# Patient Record
Sex: MALE | Race: WHITE | NOT HISPANIC OR LATINO | Employment: OTHER | URBAN - METROPOLITAN AREA
[De-identification: names, ages, dates, MRNs, and addresses within clinical notes are randomized per-mention and may not be internally consistent; named-entity substitution may affect disease eponyms.]

---

## 2017-03-14 ENCOUNTER — HOSPITAL ENCOUNTER (INPATIENT)
Facility: MEDICAL CENTER | Age: 82
LOS: 2 days | DRG: 369 | End: 2017-03-16
Attending: EMERGENCY MEDICINE | Admitting: HOSPITALIST
Payer: MEDICARE

## 2017-03-14 ENCOUNTER — RESOLUTE PROFESSIONAL BILLING HOSPITAL PROF FEE (OUTPATIENT)
Dept: HOSPITALIST | Facility: MEDICAL CENTER | Age: 82
End: 2017-03-14
Payer: MEDICARE

## 2017-03-14 DIAGNOSIS — K92.2 GASTROINTESTINAL HEMORRHAGE, UNSPECIFIED GASTROINTESTINAL HEMORRHAGE TYPE: ICD-10-CM

## 2017-03-14 LAB
ABO GROUP BLD: NORMAL
ABO GROUP BLD: NORMAL
ALBUMIN SERPL BCP-MCNC: 3.6 G/DL (ref 3.2–4.9)
ALBUMIN/GLOB SERPL: 1.4 G/DL
ALP SERPL-CCNC: 157 U/L (ref 30–99)
ALT SERPL-CCNC: 22 U/L (ref 2–50)
ANION GAP SERPL CALC-SCNC: 7 MMOL/L (ref 0–11.9)
APTT PPP: 33.5 SEC (ref 24.7–36)
AST SERPL-CCNC: 28 U/L (ref 12–45)
BASOPHILS # BLD AUTO: 0.4 % (ref 0–1.8)
BASOPHILS # BLD: 0.01 K/UL (ref 0–0.12)
BILIRUB SERPL-MCNC: 1.6 MG/DL (ref 0.1–1.5)
BLD GP AB SCN SERPL QL: NORMAL
BUN SERPL-MCNC: 22 MG/DL (ref 8–22)
CALCIUM SERPL-MCNC: 9 MG/DL (ref 8.5–10.5)
CHLORIDE SERPL-SCNC: 104 MMOL/L (ref 96–112)
CO2 SERPL-SCNC: 27 MMOL/L (ref 20–33)
CREAT SERPL-MCNC: 0.77 MG/DL (ref 0.5–1.4)
EOSINOPHIL # BLD AUTO: 0.1 K/UL (ref 0–0.51)
EOSINOPHIL NFR BLD: 3.8 % (ref 0–6.9)
ERYTHROCYTE [DISTWIDTH] IN BLOOD BY AUTOMATED COUNT: 44.5 FL (ref 35.9–50)
GFR SERPL CREATININE-BSD FRML MDRD: >60 ML/MIN/1.73 M 2
GLOBULIN SER CALC-MCNC: 2.5 G/DL (ref 1.9–3.5)
GLUCOSE SERPL-MCNC: 94 MG/DL (ref 65–99)
HCT VFR BLD AUTO: 36.9 % (ref 42–52)
HGB BLD-MCNC: 11.8 G/DL (ref 14–18)
IMM GRANULOCYTES # BLD AUTO: 0 K/UL (ref 0–0.11)
IMM GRANULOCYTES NFR BLD AUTO: 0 % (ref 0–0.9)
INR PPP: 1.13 (ref 0.87–1.13)
LYMPHOCYTES # BLD AUTO: 0.51 K/UL (ref 1–4.8)
LYMPHOCYTES NFR BLD: 19.2 % (ref 22–41)
MCH RBC QN AUTO: 25.8 PG (ref 27–33)
MCHC RBC AUTO-ENTMCNC: 32 G/DL (ref 33.7–35.3)
MCV RBC AUTO: 80.7 FL (ref 81.4–97.8)
MONOCYTES # BLD AUTO: 0.19 K/UL (ref 0–0.85)
MONOCYTES NFR BLD AUTO: 7.1 % (ref 0–13.4)
NEUTROPHILS # BLD AUTO: 1.85 K/UL (ref 1.82–7.42)
NEUTROPHILS NFR BLD: 69.5 % (ref 44–72)
NRBC # BLD AUTO: 0 K/UL
NRBC BLD AUTO-RTO: 0 /100 WBC
PLATELET # BLD AUTO: 72 K/UL (ref 164–446)
PMV BLD AUTO: 11.1 FL (ref 9–12.9)
POTASSIUM SERPL-SCNC: 3.8 MMOL/L (ref 3.6–5.5)
PROT SERPL-MCNC: 6.1 G/DL (ref 6–8.2)
PROTHROMBIN TIME: 14.9 SEC (ref 12–14.6)
RBC # BLD AUTO: 4.57 M/UL (ref 4.7–6.1)
RH BLD: NORMAL
SODIUM SERPL-SCNC: 138 MMOL/L (ref 135–145)
TROPONIN I SERPL-MCNC: <0.01 NG/ML (ref 0–0.04)
WBC # BLD AUTO: 2.7 K/UL (ref 4.8–10.8)

## 2017-03-14 PROCEDURE — 36415 COLL VENOUS BLD VENIPUNCTURE: CPT

## 2017-03-14 PROCEDURE — 99223 1ST HOSP IP/OBS HIGH 75: CPT | Mod: AI | Performed by: HOSPITALIST

## 2017-03-14 PROCEDURE — 700105 HCHG RX REV CODE 258: Performed by: EMERGENCY MEDICINE

## 2017-03-14 PROCEDURE — 84484 ASSAY OF TROPONIN QUANT: CPT

## 2017-03-14 PROCEDURE — 304561 HCHG STAT O2

## 2017-03-14 PROCEDURE — 85025 COMPLETE CBC W/AUTO DIFF WBC: CPT

## 2017-03-14 PROCEDURE — 86850 RBC ANTIBODY SCREEN: CPT

## 2017-03-14 PROCEDURE — 96365 THER/PROPH/DIAG IV INF INIT: CPT

## 2017-03-14 PROCEDURE — 700111 HCHG RX REV CODE 636 W/ 250 OVERRIDE (IP): Performed by: EMERGENCY MEDICINE

## 2017-03-14 PROCEDURE — 770006 HCHG ROOM/CARE - MED/SURG/GYN SEMI*

## 2017-03-14 PROCEDURE — 96366 THER/PROPH/DIAG IV INF ADDON: CPT

## 2017-03-14 PROCEDURE — 80053 COMPREHEN METABOLIC PANEL: CPT

## 2017-03-14 PROCEDURE — 86901 BLOOD TYPING SEROLOGIC RH(D): CPT

## 2017-03-14 PROCEDURE — 85730 THROMBOPLASTIN TIME PARTIAL: CPT

## 2017-03-14 PROCEDURE — 94760 N-INVAS EAR/PLS OXIMETRY 1: CPT

## 2017-03-14 PROCEDURE — 86900 BLOOD TYPING SEROLOGIC ABO: CPT

## 2017-03-14 PROCEDURE — 85610 PROTHROMBIN TIME: CPT

## 2017-03-14 PROCEDURE — 99285 EMERGENCY DEPT VISIT HI MDM: CPT

## 2017-03-14 PROCEDURE — 304562 HCHG STAT O2 MASK/CANNULA

## 2017-03-14 PROCEDURE — 96375 TX/PRO/DX INJ NEW DRUG ADDON: CPT

## 2017-03-14 RX ORDER — FEXOFENADINE HCL 60 MG/1
60 TABLET, FILM COATED ORAL DAILY
COMMUNITY

## 2017-03-14 RX ORDER — ATORVASTATIN CALCIUM 10 MG/1
10 TABLET, FILM COATED ORAL DAILY
COMMUNITY

## 2017-03-14 RX ORDER — HYDRALAZINE HYDROCHLORIDE 20 MG/ML
10 INJECTION INTRAMUSCULAR; INTRAVENOUS EVERY 6 HOURS PRN
Status: DISCONTINUED | OUTPATIENT
Start: 2017-03-14 | End: 2017-03-16 | Stop reason: HOSPADM

## 2017-03-14 RX ORDER — FLUTICASONE PROPIONATE 50 MCG
1 SPRAY, SUSPENSION (ML) NASAL DAILY
COMMUNITY

## 2017-03-14 RX ORDER — MELOXICAM 7.5 MG/1
7.5 TABLET ORAL DAILY
Status: ON HOLD | COMMUNITY
End: 2017-03-16

## 2017-03-14 RX ORDER — ONDANSETRON 2 MG/ML
4 INJECTION INTRAMUSCULAR; INTRAVENOUS EVERY 4 HOURS PRN
Status: DISCONTINUED | OUTPATIENT
Start: 2017-03-14 | End: 2017-03-16 | Stop reason: HOSPADM

## 2017-03-14 RX ORDER — SODIUM CHLORIDE 9 MG/ML
500 INJECTION, SOLUTION INTRAVENOUS ONCE
Status: COMPLETED | OUTPATIENT
Start: 2017-03-14 | End: 2017-03-14

## 2017-03-14 RX ORDER — OCTREOTIDE ACETATE 50 UG/ML
50 INJECTION, SOLUTION INTRAVENOUS; SUBCUTANEOUS ONCE
Status: COMPLETED | OUTPATIENT
Start: 2017-03-14 | End: 2017-03-14

## 2017-03-14 RX ORDER — PANTOPRAZOLE SODIUM 40 MG/10ML
40 INJECTION, POWDER, LYOPHILIZED, FOR SOLUTION INTRAVENOUS 2 TIMES DAILY
Status: DISCONTINUED | OUTPATIENT
Start: 2017-03-14 | End: 2017-03-16 | Stop reason: HOSPADM

## 2017-03-14 RX ORDER — ONDANSETRON 4 MG/1
4 TABLET, ORALLY DISINTEGRATING ORAL EVERY 4 HOURS PRN
Status: DISCONTINUED | OUTPATIENT
Start: 2017-03-14 | End: 2017-03-16 | Stop reason: HOSPADM

## 2017-03-14 RX ADMIN — SODIUM CHLORIDE 500 ML: 9 INJECTION, SOLUTION INTRAVENOUS at 16:33

## 2017-03-14 RX ADMIN — OCTREOTIDE ACETATE 50 MCG: 100 INJECTION, SOLUTION INTRAVENOUS; SUBCUTANEOUS at 17:45

## 2017-03-14 RX ADMIN — OCTREOTIDE ACETATE 50 MCG/HR: 200 INJECTION, SOLUTION INTRAVENOUS; SUBCUTANEOUS at 17:58

## 2017-03-14 ASSESSMENT — LIFESTYLE VARIABLES
EVER_SMOKED: YES
ALCOHOL_USE: NO
DO YOU DRINK ALCOHOL: NO

## 2017-03-14 ASSESSMENT — PAIN SCALES - GENERAL
PAINLEVEL_OUTOF10: 0
PAINLEVEL_OUTOF10: 0

## 2017-03-14 NOTE — ED NOTES
Jesus Manuel avilez, report transferred [facility], had an EGD for esophageal varices, non bleeding,  and noted pool of blood in stomach. Upon arrival pt c/o feeling sleepy; was given 50 mcg fentanyl, 2 mg versed, 2 sprays topex intraprocedure. Pt states unable to vomit d/t hiatal hernia repair.

## 2017-03-14 NOTE — IP AVS SNAPSHOT
[employer] Access Code: [email or web address]  Expires: 4/15/2017  3:36 PM    Your email address is not on file at [facility].  Email Addresses are required for you to sign up for [employer], please contact [phone] to verify your personal information and to provide your email address prior to attempting to register for [employer].    Georges Levy Yogi  [address] [place] [address]    [employer]  A secure, online tool to manage your health information     [facility]’s [employer]® is a secure, online tool that connects you to your personalized health information from the privacy of your home -- day or night - making it very easy for you to manage your healthcare. Once the activation process is completed, you can even access your medical information using the [employer] mckinley, which is available for free in the Apple Mckinley store or Google Play store.     To learn more about [employer], visit [email or web address]    There are two levels of access available (as shown below):   My Chart Features  [facility] Primary Care Doctor [facility]  Specialists [facility]  Urgent  Care Non-[facility] Primary Care Doctor   Email your healthcare team securely and privately 24/7 X X X    Manage appointments: schedule your next appointment; view details of past/upcoming appointments X      Request prescription refills. X      View recent personal medical records, including lab and immunizations X X X X   View health record, including health history, allergies, medications X X X X   Read reports about your outpatient visits, procedures, consult and ER notes X X X X   See your discharge summary, which is a recap of your hospital and/or ER visit that includes your diagnosis, lab results, and care plan X X  X     How to register for [employer]:  Once your e-mail address has been verified, follow the following steps to sign up for [employer].     1. Go to  [email or web address]  2. Click on the Sign Up Now box, which takes you to the New Member Sign Up page. You will  need to provide the following information:  a. Enter your [employer] Access Code exactly as it appears at the top of this page. (You will not need to use this code after you’ve completed the sign-up process. If you do not sign up before the expiration date, you must request a new code.)   b. Enter your date of birth.   c. Enter your home email address.   d. Click Submit, and follow the next screen’s instructions.  3. Create a [employer] ID. This will be your [employer] login ID and cannot be changed, so think of one that is secure and easy to remember.  4. Create a [employer] password. You can change your password at any time.  5. Enter your Password Reset Question and Answer. This can be used at a later time if you forget your password.   6. Enter your e-mail address. This allows you to receive e-mail notifications when new information is available in [employer].  7. Click Sign Up. You can now view your health information.    For assistance activating your [employer] account, call [phone]

## 2017-03-14 NOTE — IP AVS SNAPSHOT
" Home Care Instructions                                                                                                                  Name:Georges Calix  Medical Record Number:[id number]  CSN: [id number]    YOB: 1934   Age: 83 y.o.  Sex: male  HT:1.702 m (5' 7.01\") WT: 69 kg (152 lb 1.9 oz)          Admit Date: 3/14/2017     Discharge Date:   Today's Date: 3/16/2017  Attending Doctor:  Tobin Gutiérrez M.D.                  Allergies:  Sulfa drugs            Discharge Instructions       Discharge Instructions    Discharged to home by car with relative. Discharged via wheelchair, hospital escort: Yes.  Special equipment needed: Not Applicable    Be sure to schedule a follow-up appointment with your primary care doctor or any specialists as instructed.     Discharge Plan:   Diet Plan: Discussed  Activity Level: Discussed  Confirmed Follow up Appointment: Patient to Call and Schedule Appointment  Confirmed Symptoms Management: Discussed  Medication Reconciliation Updated: Yes  Influenza Vaccine Indication: Not indicated: Previously immunized this influenza season and > 8 years of age    I understand that a diet low in cholesterol, fat, and sodium is recommended for good health. Unless I have been given specific instructions below for another diet, I accept this instruction as my diet prescription.   Other diet: Low sodium, low fat, well balanced diet. Advance as tolerated.    Special Instructions: None    · Is patient discharged on Warfarin / Coumadin?   No     · Is patient Post Blood Transfusion?  No    Depression / Suicide Risk    As you are discharged from this [facility] facility, it is important to learn how to keep safe from harming yourself.    Recognize the warning signs:  · Abrupt changes in personality, positive or negative- including increase in energy   · Giving away possessions  · Change in eating patterns- significant weight changes-  positive or negative  · Change in sleeping patterns- " unable to sleep or sleeping all the time   · Unwillingness or inability to communicate  · Depression  · Unusual sadness, discouragement and loneliness  · Talk of wanting to die  · Neglect of personal appearance   · Rebelliousness- reckless behavior  · Withdrawal from people/activities they love  · Confusion- inability to concentrate     If you or a loved one observes any of these behaviors or has concerns about self-harm, here's what you can do:  · Talk about it- your feelings and reasons for harming yourself  · Remove any means that you might use to hurt yourself (examples: pills, rope, extension cords, firearm)  · Get professional help from the community (Mental Health, Substance Abuse, psychological counseling)  · Do not be alone:Call your Safe Contact- someone whom you trust who will be there for you.  · Call your local CRISIS HOTLINE 675-4825 or 264-535-4918  · Call your local Children's Mobile Crisis Response Team [place] (015) 935-8572 or www.Identify  · Call the toll free National Suicide Prevention Hotlines   · National Suicide Prevention Lifeline 918-369-NKWZ (4999)  · National Hope Line Network 800-SUICIDE (433-6613)    Gastrointestinal Bleeding  Gastrointestinal bleeding is bleeding somewhere along the path that food travels through the body (digestive tract). This path is anywhere between the mouth and the opening of the butt (anus). You may have blood in your throw up (vomit) or in your poop (stools). If there is a lot of bleeding, you may need to stay in the hospital.  HOME CARE  · Only take medicine as told by your doctor.  · Eat foods with fiber such as whole grains, fruits, and vegetables. You can also try eating 1 to 3 prunes a day.  · Drink enough fluids to keep your pee (urine) clear or pale yellow.  GET HELP RIGHT AWAY IF:   · Your bleeding gets worse.  · You feel dizzy, weak, or you pass out (faint).  · You have bad cramps in your back or belly (abdomen).  · You have large blood  clumps (clots) in your poop.  · Your problems are getting worse.  MAKE SURE YOU:   · Understand these instructions.  · Will watch your condition.  · Will get help right away if you are not doing well or get worse.     This information is not intended to replace advice given to you by your health care provider. Make sure you discuss any questions you have with your health care provider.     Document Released: 09/26/2009 Document Revised: 12/04/2013 Document Reviewed: 11/26/2012  [employer] Interactive Patient Education ©2016 [employer] Inc.      Follow-up Information     1. Follow up with Shelley Martinez M.D.. Go on 3/30/2017.    Specialty:  Gastroenterology    Why:  PLEASE ARRIVE AT 1:15PM FOR YOUR APPOINTMENT. THANK YOU    Contact information    [address]    [place] [address]  [phone]           Discharge Medication Instructions:    Below are the medications your physician expects you to take upon discharge:    Review all your home medications and newly ordered medications with your doctor and/or pharmacist. Follow medication instructions as directed by your doctor and/or pharmacist.    Please keep your medication list with you and share with your physician.               Medication List      START taking these medications        Instructions    omeprazole 20 MG delayed-release capsule   Commonly known as:  PRILOSEC   Next Dose Due:  3/17/2017   Notes to Patient:  In the morning. This is a proton pump inhibitor, it helps to protect your GI tract.    Take 1 Cap by mouth every day.   Dose:  20 mg         CONTINUE taking these medications        Instructions    aspirin EC 81 MG Tbec   Commonly known as:  ECOTRIN   Next Dose Due:  3/17/2017    Take 81 mg by mouth every day.   Dose:  81 mg       atorvastatin 10 MG Tabs   Last time this was given:  10 mg on 3/15/2017  8:51 PM   Commonly known as:  LIPITOR   Next Dose Due:  3/16/2017    Take 10 mg by mouth every day.   Dose:  10 mg       fexofenadine 60 MG Tabs      Commonly known as:  ALLEGRA   Next Dose Due:  3/17/2017    Take 60 mg by mouth every day.   Dose:  60 mg       fluticasone 50 MCG/ACT nasal spray   Last time this was given:  50 mcg on 3/16/2017  8:25 AM   Commonly known as:  FLONASE   Next Dose Due:  3/17/2017    Spray 1 Spray in nose every day.   Dose:  1 Spray         STOP taking these medications     meloxicam 7.5 MG Tabs   Commonly known as:  MOBIC               Instructions           Diet / Nutrition:    Follow any diet instructions given to you by your doctor or the dietician, including how much salt (sodium) you are allowed each day.    If you are overweight, talk to your doctor about a weight reduction plan.    Activity:    Remain physically active following your doctor's instructions about exercise and activity.    Rest often.     Any time you become even a little tired or short of breath, SIT DOWN and rest.    Worsening Symptoms:    Report any of the following signs and symptoms to the doctor's office immediately:    *Pain of jaw, arm, or neck  *Chest pain not relieved by medication                               *Dizziness or loss of consciousness  *Difficulty breathing even when at rest   *More tired than usual                                       *Bleeding drainage or swelling of surgical site  *Swelling of feet, ankles, legs or stomach                 *Fever (>100ºF)  *Pink or blood tinged sputum  *Weight gain (3lbs/day or 5lbs /week)           *Shock from internal defibrillator (if applicable)  *Palpitations or irregular heartbeats                *Cool and/or numb extremities    Stroke Awareness    Common Risk Factors for Stroke include:    Age  Atrial Fibrillation  Carotid Artery Stenosis  Diabetes Mellitus  Excessive alcohol consumption  High blood pressure  Overweight   Physical inactivity  Smoking    Warning signs and symptoms of a stroke include:    *Sudden numbness or weakness of the face, arm or leg (especially on one side of the  body).  *Sudden confusion, trouble speaking or understanding.  *Sudden trouble seeing in one or both eyes.  *Sudden trouble walking, dizziness, loss of balance or coordination.Sudden severe headache with no known cause.    It is very important to get treatment quickly when a stroke occurs. If you experience any of the above warning signs, call 911 immediately.                   Disclaimer         Quit Smoking / Tobacco Use:    I understand the use of any tobacco products increases my chance of suffering from future heart disease or stroke and could cause other illnesses which may shorten my life. Quitting the use of tobacco products is the single most important thing I can do to improve my health. For further information on smoking / tobacco cessation call a Toll Free Quit Line at 1-826.360.5994 (*National Cancer [place]) or 1-234.989.7842 (American Lung Association) or you can access the web based program at www.lungusa.org.    [place] Tobacco Users Help Line:  (854) 209-6147       Toll Free: 1-893.922.1851  Quit Tobacco Program [facility] (044)295-3525    Crisis Hotline:    [place] Crisis Hotline:  5-013-EALZALH or 1-887.616.4803    [place] Crisis Hotline:    1-533.855.7362 or 593-158-3153    Discharge Survey:   Thank you for choosing [facility]. We hope we did everything we could to make your hospital stay a pleasant one. You may be receiving a phone survey and we would appreciate your time and participation in answering the questions. Your input is very valuable to us in our efforts to improve our service to our patients and their families.        My signature on this form indicates that:    1. I have reviewed and understand the above information.  2. My questions regarding this information have been answered to my satisfaction.  3. I have formulated a plan with my discharge nurse to obtain my prescribed medications for home.                  Disclaimer          __________________________________                     __________       ________                       Patient Signature                                                 Date                    Time

## 2017-03-14 NOTE — IP AVS SNAPSHOT
3/16/2017          Georges Calix  [address] [facility] [place] [address]    Dear Georges:    [facility] wants to ensure your discharge home is safe and you or your loved ones have had all your questions answered regarding your care after you leave the hospital.    You may receive a telephone call within two days of your discharge.  This call is to make certain you understand your discharge instructions as well as ensure we provided you with the best care possible during your stay with us.     The call will only last approximately 3-5 minutes and will be done by a nurse.    Once again, we want to ensure your discharge home is safe and that you have a clear understanding of any next steps in your care.  If you have any questions or concerns, please do not hesitate to contact us, we are here for you.  Thank you for choosing [facility] for your healthcare needs.    Sincerely,    Onesimo Diana    [facility]

## 2017-03-14 NOTE — IP AVS SNAPSHOT
" <p align=\"LEFT\"><IMG SRC=\"//EMRWB/blob$/[email or web address]\" alt=\"Image\" WIDTH=\"50%\" HEIGHT=\"200\" BORDER=\"\"></p>                   Name:Georges Calix  Medical Record Number:[id number]  CSN: [id number]    YOB: 1934   Age: 83 y.o.  Sex: male  HT:1.702 m (5' 7.01\") WT: 69 kg (152 lb 1.9 oz)          Admit Date: 3/14/2017     Discharge Date:   Today's Date: 3/16/2017  Attending Doctor:  Tobin Gutiérrez M.D.                  Allergies:  Sulfa drugs          Follow-up Information     1. Follow up with Shelley Martinez M.D.. Go on 3/30/2017.    Specialty:  Gastroenterology    Why:  PLEASE ARRIVE AT 1:15PM FOR YOUR APPOINTMENT. THANK YOU    Contact information    [address] [place] [id number]  [phone]           Medication List      Take these Medications        Instructions    aspirin EC 81 MG Tbec   Commonly known as:  ECOTRIN    Take 81 mg by mouth every day.   Dose:  81 mg       atorvastatin 10 MG Tabs   Commonly known as:  LIPITOR    Take 10 mg by mouth every day.   Dose:  10 mg       fexofenadine 60 MG Tabs   Commonly known as:  ALLEGRA    Take 60 mg by mouth every day.   Dose:  60 mg       fluticasone 50 MCG/ACT nasal spray   Commonly known as:  FLONASE    Spray 1 Spray in nose every day.   Dose:  1 Spray       omeprazole 20 MG delayed-release capsule   Commonly known as:  PRILOSEC   Notes to Patient:  In the morning. This is a proton pump inhibitor, it helps to protect your GI tract.    Take 1 Cap by mouth every day.   Dose:  20 mg         "

## 2017-03-14 NOTE — ED PROVIDER NOTES
"ED Provider Note    Scribed for Sav Salas M.D. by Henrietta Vazquez. 3/14/2017  4:26 PM    Primary care provider: Pcp Not In Computer  Means of arrival: Ambulance  History obtained from: Patient  History limited by: None    CHIEF COMPLAINT  Chief Complaint   Patient presents with   • Upper GI Bleed       HPI  Georges Calix is a 83 y.o. male who presents to the Emergency Department by ambulance for evaluation of an upper GI bleed.  Patient was [facility] after they noticed a large pool of blood during and endoscopy for further evaluation. Patient has large distal varices Patient has no associated intermittent chest pain. He denies any vomiting secondary to his hiatal hernia repair in the past.  Patient sees Dr. Mejia for his esophageal and GI issues. Denies abdominal pains, nausea vomiting, diarrhea, black or bloody bowel movements.    REVIEW OF SYSTEMS  Pertinent positives include GI bleed, chest pain. Pertinent negatives include no vomiting.  All other systems reviewed and negative.    PAST MEDICAL HISTORY  Hiatal hernia repair.     SURGICAL HISTORY  patient denies any surgical history    SOCIAL HISTORY   Patient lives in [place], [place] with his wife.     FAMILY HISTORY  No pertinent family history    CURRENT MEDICATIONS  No current facility-administered medications on file prior to encounter.     No current outpatient prescriptions on file prior to encounter.       ALLERGIES  Allergies   Allergen Reactions   • Sulfa Drugs        PHYSICAL EXAM  VITAL SIGNS: /92 mmHg  Pulse 61  Temp(Src) 37.1 °C (98.7 °F)  Resp 20  Ht 1.702 m (5' 7.01\")  Wt 71.668 kg (158 lb)  BMI 24.74 kg/m2  SpO2 100%    Constitutional: Slightly pale, Well developed, Well nourished, No distress, Non-toxic appearance.   HENT: Normocephalic, Atraumatic, Bilateral external ears normal, Oropharynx moist, No oral exudates.   Eyes: PERRLA, EOMI, Conjunctiva normal, No discharge.   Neck: No " tenderness, Supple, No stridor.   Lymphatic: No lymphadenopathy noted.   Cardiovascular: Normal heart rate, Normal rhythm.   Thorax & Lungs: Clear to auscultation bilaterally, No respiratory distress, No wheezing, No crackles.   Abdomen: Soft, No tenderness, No masses, No pulsatile masses.   Skin: Warm, Dry, No erythema, No rash.   Extremities:, No edema No cyanosis.   Musculoskeletal: No tenderness to palpation or major deformities noted.  Intact distal pulses  Neurologic: Awake, alert. Moves all extremities spontaneously.  Psychiatric: Affect normal, Judgment normal, Mood normal.     LABS  Labs Reviewed   CBC WITH DIFFERENTIAL - Abnormal; Notable for the following:     WBC 2.7 (*)     RBC 4.57 (*)     Hemoglobin 11.8 (*)     Hematocrit 36.9 (*)     MCV 80.7 (*)     MCH 25.8 (*)     MCHC 32.0 (*)     Platelet Count 72 (*)     Lymphocytes 19.20 (*)     Lymphs (Absolute) 0.51 (*)     All other components within normal limits    Narrative:     Indicate which anticoagulants the patient is on:->UNKNOWN   COMP METABOLIC PANEL - Abnormal; Notable for the following:     Alkaline Phosphatase 157 (*)     Total Bilirubin 1.6 (*)     All other components within normal limits    Narrative:     Indicate which anticoagulants the patient is on:->UNKNOWN   PROTHROMBIN TIME - Abnormal; Notable for the following:     PT 14.9 (*)     All other components within normal limits    Narrative:     Indicate which anticoagulants the patient is on:->UNKNOWN   APTT    Narrative:     Indicate which anticoagulants the patient is on:->UNKNOWN   COD (ADULT)   TROPONIN    Narrative:     Indicate which anticoagulants the patient is on:->UNKNOWN   ABO CONFIRMATION   ESTIMATED GFR    Narrative:     Indicate which anticoagulants the patient is on:->UNKNOWN     All labs reviewed by me.    RADIOLOGY  No orders to display     The radiologist's interpretation of all radiological studies have been reviewed by me.    COURSE & MEDICAL DECISION  MAKING  Pertinent Labs & Imaging studies reviewed. (See chart for details)    I reviewed the patient's medical records which showed patient was receiving an endoscopy by Dr. Mejia who noted a large pool of blood in stomach so sent patient to ED for further evaluation. He did not see any active bleeding.     4:26 PM - Patient seen and examined at bedside. Patient will be treated with 500mL NS infusion. Ordered CBC, CMP, APTT, prothrombin time, COD, troponin, estimated GFR and other labs to evaluate his symptoms.  Decision Making:  Patient with gastric bleeding from esophageal varices, discussed the case with Dr. Arias who recommends octreotide drip, he will see the patient tonight and likely scope the patient tomorrow morning, discussed the case with Dr. Majano for admission to the hospital.       FINAL IMPRESSION  1. Gastrointestinal hemorrhage, unspecified gastrointestinal hemorrhage type          I, Henrietta Vazquez (Scribe), am scribing for, and in the presence of, Sav Salas M.D..    Electronically signed by: Henrietta Vazquez (Kenn[place]), 3/14/2017    I, Sav Salas M.D. personally performed the services described in this documentation, as scribed by Henrietta Vazquez in my presence, and it is both accurate and complete.    The note accurately reflects work and decisions made by me.  Sav Salas  3/14/2017  6:02 PM

## 2017-03-15 PROBLEM — D62 ACUTE BLOOD LOSS ANEMIA: Status: ACTIVE | Noted: 2017-03-15

## 2017-03-15 PROBLEM — E78.5 DYSLIPIDEMIA: Status: ACTIVE | Noted: 2017-03-15

## 2017-03-15 PROBLEM — K74.69 CRYPTOGENIC CIRRHOSIS (HCC): Status: ACTIVE | Noted: 2017-03-15

## 2017-03-15 PROBLEM — D61.818 PANCYTOPENIA (HCC): Status: ACTIVE | Noted: 2017-03-15

## 2017-03-15 PROBLEM — J44.9 COPD (CHRONIC OBSTRUCTIVE PULMONARY DISEASE) (HCC): Status: ACTIVE | Noted: 2017-03-15

## 2017-03-15 PROBLEM — Z87.19 HISTORY OF ESOPHAGEAL VARICES WITH BLEEDING: Status: ACTIVE | Noted: 2017-03-15

## 2017-03-15 LAB
ALBUMIN SERPL BCP-MCNC: 3.2 G/DL (ref 3.2–4.9)
ALBUMIN/GLOB SERPL: 1.4 G/DL
ALP SERPL-CCNC: 144 U/L (ref 30–99)
ALT SERPL-CCNC: 20 U/L (ref 2–50)
ANION GAP SERPL CALC-SCNC: 4 MMOL/L (ref 0–11.9)
AST SERPL-CCNC: 28 U/L (ref 12–45)
BILIRUB SERPL-MCNC: 1.9 MG/DL (ref 0.1–1.5)
BUN SERPL-MCNC: 19 MG/DL (ref 8–22)
CALCIUM SERPL-MCNC: 8.6 MG/DL (ref 8.5–10.5)
CHLORIDE SERPL-SCNC: 107 MMOL/L (ref 96–112)
CO2 SERPL-SCNC: 27 MMOL/L (ref 20–33)
CREAT SERPL-MCNC: 0.68 MG/DL (ref 0.5–1.4)
GFR SERPL CREATININE-BSD FRML MDRD: >60 ML/MIN/1.73 M 2
GLOBULIN SER CALC-MCNC: 2.3 G/DL (ref 1.9–3.5)
GLUCOSE SERPL-MCNC: 127 MG/DL (ref 65–99)
HGB BLD-MCNC: 10.7 G/DL (ref 14–18)
HGB BLD-MCNC: 11.4 G/DL (ref 14–18)
HGB BLD-MCNC: 11.6 G/DL (ref 14–18)
POTASSIUM SERPL-SCNC: 3.8 MMOL/L (ref 3.6–5.5)
PROT SERPL-MCNC: 5.5 G/DL (ref 6–8.2)
SODIUM SERPL-SCNC: 138 MMOL/L (ref 135–145)

## 2017-03-15 PROCEDURE — 700111 HCHG RX REV CODE 636 W/ 250 OVERRIDE (IP)

## 2017-03-15 PROCEDURE — 160203 HCHG ENDO MINUTES - 1ST 30 MINS LEVEL 4: Performed by: INTERNAL MEDICINE

## 2017-03-15 PROCEDURE — 700102 HCHG RX REV CODE 250 W/ 637 OVERRIDE(OP): Performed by: INTERNAL MEDICINE

## 2017-03-15 PROCEDURE — 99153 MOD SED SAME PHYS/QHP EA: CPT | Performed by: INTERNAL MEDICINE

## 2017-03-15 PROCEDURE — 700111 HCHG RX REV CODE 636 W/ 250 OVERRIDE (IP): Performed by: HOSPITALIST

## 2017-03-15 PROCEDURE — 160048 HCHG OR STATISTICAL LEVEL 1-5: Performed by: INTERNAL MEDICINE

## 2017-03-15 PROCEDURE — 36415 COLL VENOUS BLD VENIPUNCTURE: CPT

## 2017-03-15 PROCEDURE — 99233 SBSQ HOSP IP/OBS HIGH 50: CPT | Performed by: INTERNAL MEDICINE

## 2017-03-15 PROCEDURE — 160208 HCHG ENDO MINUTES - EA ADDL 1 MIN LEVEL 4: Performed by: INTERNAL MEDICINE

## 2017-03-15 PROCEDURE — C9113 INJ PANTOPRAZOLE SODIUM, VIA: HCPCS | Performed by: HOSPITALIST

## 2017-03-15 PROCEDURE — 80053 COMPREHEN METABOLIC PANEL: CPT

## 2017-03-15 PROCEDURE — 160035 HCHG PACU - 1ST 60 MINS PHASE I: Performed by: INTERNAL MEDICINE

## 2017-03-15 PROCEDURE — 06L34CZ OCCLUSION OF ESOPHAGEAL VEIN WITH EXTRALUMINAL DEVICE, PERCUTANEOUS ENDOSCOPIC APPROACH: ICD-10-PCS | Performed by: INTERNAL MEDICINE

## 2017-03-15 PROCEDURE — 502240 HCHG MISC OR SUPPLY RC 0272: Performed by: INTERNAL MEDICINE

## 2017-03-15 PROCEDURE — 770020 HCHG ROOM/CARE - TELE (206)

## 2017-03-15 PROCEDURE — 160002 HCHG RECOVERY MINUTES (STAT): Performed by: INTERNAL MEDICINE

## 2017-03-15 PROCEDURE — 85018 HEMOGLOBIN: CPT

## 2017-03-15 PROCEDURE — A9270 NON-COVERED ITEM OR SERVICE: HCPCS | Performed by: INTERNAL MEDICINE

## 2017-03-15 PROCEDURE — 99152 MOD SED SAME PHYS/QHP 5/>YRS: CPT | Performed by: INTERNAL MEDICINE

## 2017-03-15 PROCEDURE — 500066 HCHG BITE BLOCK, ECT: Performed by: INTERNAL MEDICINE

## 2017-03-15 RX ORDER — MIDAZOLAM HYDROCHLORIDE 1 MG/ML
.5-2 INJECTION INTRAMUSCULAR; INTRAVENOUS PRN
Status: ACTIVE | OUTPATIENT
Start: 2017-03-15 | End: 2017-03-15

## 2017-03-15 RX ORDER — FLUTICASONE PROPIONATE 50 MCG
1 SPRAY, SUSPENSION (ML) NASAL DAILY
Status: DISCONTINUED | OUTPATIENT
Start: 2017-03-16 | End: 2017-03-16 | Stop reason: HOSPADM

## 2017-03-15 RX ORDER — MIDAZOLAM HYDROCHLORIDE 1 MG/ML
INJECTION INTRAMUSCULAR; INTRAVENOUS
Status: DISCONTINUED | OUTPATIENT
Start: 2017-03-15 | End: 2017-03-15 | Stop reason: HOSPADM

## 2017-03-15 RX ORDER — SODIUM CHLORIDE 9 MG/ML
500 INJECTION, SOLUTION INTRAVENOUS PRN
Status: DISCONTINUED | OUTPATIENT
Start: 2017-03-15 | End: 2017-03-16 | Stop reason: HOSPADM

## 2017-03-15 RX ORDER — ATORVASTATIN CALCIUM 10 MG/1
10 TABLET, FILM COATED ORAL DAILY
Status: DISCONTINUED | OUTPATIENT
Start: 2017-03-15 | End: 2017-03-16 | Stop reason: HOSPADM

## 2017-03-15 RX ADMIN — PANTOPRAZOLE SODIUM 40 MG: 40 INJECTION, POWDER, FOR SOLUTION INTRAVENOUS at 20:51

## 2017-03-15 RX ADMIN — PANTOPRAZOLE SODIUM 40 MG: 40 INJECTION, POWDER, FOR SOLUTION INTRAVENOUS at 08:20

## 2017-03-15 RX ADMIN — ATORVASTATIN CALCIUM 10 MG: 10 TABLET, FILM COATED ORAL at 20:51

## 2017-03-15 ASSESSMENT — PAIN SCALES - GENERAL
PAINLEVEL_OUTOF10: 1
PAINLEVEL_OUTOF10: 0
PAINLEVEL_OUTOF10: 1
PAINLEVEL_OUTOF10: 1

## 2017-03-15 NOTE — PROGRESS NOTES
Awaiting transfer, drip will resume when pt is back on tele monitor. Report given to SOHAM Ferrer.

## 2017-03-15 NOTE — PROGRESS NOTES
Received report on pt, pt AAOX3, arrived from the ER with a GI bleed, 2L nasal cannula, incontinent X2, no complaints of pain or N/V at this time.

## 2017-03-15 NOTE — PROGRESS NOTES
Pre procedure note    Chart reviewed, patient examined. Will proceed with EGD with banding as planned    EMO

## 2017-03-15 NOTE — H&P
GASTROENTEROLOGY DOCTOR:  Shelley Martinez MD    CHIEF COMPLAINT:  Referred to ER with GI bleeding during elective EGD.    HISTORY OF PRESENT ILLNESS:  Patient is an 83-year-old male with history of   cryptogenic cirrhosis, transferred to [facility] from Dr. Martinez, GI clinic   following elective EGD.  Patient had a procedure done this afternoon with   findings of esophageal varices with a pool of fresh blood in the stomach.    Patient with findings and concerned for acute decompensation, transferred to   [facility].  He reports no abdominal pain, melena, or hematochezia.  No   nausea, vomiting, or diarrhea.  No lightheadedness, dizziness, or chest pain.    He has been tolerating his diet.    With findings, Dr. Mu Arias, GI consultant planned for octreotide drip,   IV Protonix, clear liquid diet with a planned EGD tomorrow for possible   esophageal variceal banding.    REVIEW OF SYSTEMS:  As above, otherwise negative according to AMA/CMS   criteria.    PAST MEDICAL HISTORY:  1.  Cryptogenic cirrhosis.  2.  History of hepatitis A.  He reports infected twice in the 1950.  3.  Dyslipidemia.  4.  History of hiatal hernia with Schatzki's ring and surgical repair.  5.  Chronic left hip pain.    PAST SURGICAL HISTORY:  Right shoulder surgery, laparoscopic cholecystectomy   with abscess requiring I and D treatment, and antibiotics.    MEDICATIONS:  Aspirin 81 mg daily, Lipitor 10 mg daily, Allegra 60 mg daily,   Flonase 50 mcg daily, and Mobic 7.5 mg daily.    ALLERGIES:  SULFA DRUGS.    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    No alcohol.  , lives with his wife.    FAMILY HISTORY:  Father  of MI at age 71.    PHYSICAL EXAMINATION:  CURRENT VITAL SIGNS:  Temperature 37.1, pulse 50, respiratory rate 18, blood   pressure 174/ systolic, 100% on 2 liters, and ____ kilograms.  GENERAL:  Patient is alert, appropriate, oriented.  No apparent distress, was   thin  set.  HEENT:  Anicteric.  Extraocular movements are intact.  Mucous membranes were   moist.  NECK:  No cervical or supraclavicular adenopathy.  Trachea is midline.  CARDIOVASCULAR:  Bradycardic, regular without murmurs.  LUNGS:  Clear to auscultation bilaterally.  Normal chest wall excursion effort   without chest wall tenderness.  ABDOMEN:  Bowel sounds present, soft, nontender, nondistended.  No   hepatosplenomegaly.  No palpable masses or pulsatile masses.  BACK:  No CVA or paraspinal tenderness.  EXTREMITIES:  There is no lower extremity edema, negative Homans sign,   symmetric, generalized 5/5 strength.  NEUROLOGIC:  Cranial nerves grossly intact.  No focal weakness.  SKIN:  Warm, dry without pallor.  PSYCHIATRIC:  Calm, cooperative without depressed affect.    LABORATORY DATA:  White count 2.7, hemoglobin 11.8, platelet count of 72,000,   and MCV of 80.  BUN and creatinine 22/0.7.  Troponin less than 0.01.  Total   bilirubin 1.6 and alkaline phosphatase 157.  INR 1.13.    IMPRESSION AND PLAN:  1.  Esophageal variceal bleed.  Patient will be admitted acute to medical   floor.  We will be started on IV Protonix, octreotide drip, follow serial   hemoglobin.  Plan for EGD in the morning, attempt at banding of varices.  2.  Anemia, multifactorial, probably acute blood loss, chronic disease.  We   will follow serial H and H, transfuse ____ decline or symptoms.  3.  Pancytopenia, attributed to cirrhosis.  4.  Cryptogenic cirrhosis.  5.  Dyslipidemia.  Resume statin therapy when tolerates orals.       ____________________________________     MD JOHN WINTERS / KINSEY    DD:  03/14/2017 21:39:12  DT:  03/14/2017 22:41:52    D#:  [id number]  Job#:  [id number]

## 2017-03-15 NOTE — DISCHARGE PLANNING
Care Transition Team Assessment    Information Source  Orientation : Oriented x 4  Information Given By: Patient  Informant's Name: Georges  Who is responsible for making decisions for patient? : Patient    Readmission Evaluation  Is this a readmission?: No    Elopement Risk  Legal Hold: No  Ambulatory or Self Mobile in Wheelchair: Yes  Disoriented: No  Psychiatric Symptoms: None  History of Wandering: No  Elopement this Admit: No  Vocalizing Wanting to Leave: No  Displays Behaviors, Body Language Wanting to Leave: No-Not at Risk for Elopement  Elopement Risk: Not at Risk for Elopement    Interdisciplinary Discharge Planning  Does Admitting Nurse Feel This Could be a Complex Discharge?: No  Primary Care Physician: Dr. Karina Moss  Lives with - Patient's Self Care Capacity: Spouse, Adult Children  Patient or legal guardian wants to designate a caregiver (see row info): No  Support Systems: Children, Spouse / Significant Other  Housing / Facility: [address] [place] [address]  Do You Take your Prescribed Medications Regularly: Yes  Able to Return to Previous ADL's: Yes  Mobility Issues: Yes (Walks with cane)  Prior Services: None  Patient Expects to be Discharged to:: Home  Assistance Needed: No  Durable Medical Equipment: Walker (Shower equipment)  DME Provider / Phone: Unsure    Discharge Preparedness  What is your plan after discharge?: Home with help  What are your discharge supports?: Child, Spouse  Prior Functional Level: Ambulatory, Drives Self, Independent with Activities of Daily Living, Independent with Medication Management  Difficulity with ADLs: Walking  Difficulty with ADLs Comment: Uses cane  Difficulity with IADLs: None    Functional Assesment  Prior Functional Level: Ambulatory, Drives Self, Independent with Activities of Daily Living, Independent with Medication Management    Finances  Financial Barriers to Discharge: No  Prescription Coverage: Yes ([facility] in [place] [phone])    Vision / Hearing  Impairment  Vision Impairment : Yes  Right Eye Vision: Impaired, Wears Glasses  Left Eye Vision: Impaired, Wears Glasses  Hearing Impairment : Yes  Hearing Impairment: Both Ears, Hearing Device Not Available  Does Pt Need Special Equipment for the Hearing Impaired?: No    Values / Beliefs / Concerns  Values / Beliefs Concerns : No    Advance Directive  Advance Directive?: DPOA for Health Care  Durable Power of  Name and Contact : Daughter Shannan Calix (couldn't remember phone numbers)    Domestic Abuse  Have you ever been the victim of abuse or violence?: No  Physical Abuse or Sexual Abuse: No  Verbal Abuse or Emotional Abuse: No  Possible Abuse Reported to:: Not Applicable    Psychological Assessment  History of Substance Abuse: None  History of Psychiatric Problems: No  Non-compliant with Treatment: No  Newly Diagnosed Illness: Yes    Discharge Risks or Barriers  Discharge risks or barriers?: No    Anticipated Discharge Information  Anticipated discharge disposition: Home  Discharge Address: [address] [place] [id number]  Discharge Contact Phone Number: [phone]

## 2017-03-15 NOTE — CARE PLAN
Problem: Safety  Goal: Will remain free from injury  Bed locked and in lowest position. Bed alarm on. Treaded socks. Call light and belongings within reach. Patient educated to call for assistance. Pt verbalized understanding. Hourly rounding in place.     Problem: Knowledge Deficit  Goal: Knowledge of disease process/condition, treatment plan, diagnostic tests, and medications will improve  Discussed POC and medications with patient, pt. verbalized understanding.

## 2017-03-15 NOTE — PROGRESS NOTES
Gi consult dictated:  Impression:  1. Pool of fresh blood seen in stomach this afternoon during outpatient endoscopy along with large esophageal varices and procedure was aborted without therapeutics.  2. Cryptogenic cirrhosis.  3. Esophageal varices.  4. Thrombocytopenia  5. Mild coagulopathy.  6. Chronic NSAID use for arthritis.   7. Hyperlipidemia  8. Paraesophageal hiatal hernia s/p repair.  9. Diverticulosis.  10. Osteoarthritis  Plan:  1. Admit.  2. IV octreotide and Q12 pantoprazole.  3. EGD in am with therapeutics. Dr. Colbert is covering in am.   4. Clear liquids overnight.   5. NPO at 0600  6. He should ideally avoid NSAIDs in the future-contraindicated in cirrhotics due to bleeding risk. He can probably safely take up to 2000mg a day of Tylenol.

## 2017-03-15 NOTE — CONSULTS
DATE OF SERVICE:  03/15/2017    REFERRING PHYSICIAN:  Dr. Salas from the ER    REASON FOR CONSULTATION:  Upper gastrointestinal bleeding.    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  He had a gastroscopy scheduled this afternoon because of a history   of esophageal varices.  At the time of the gastroscopy he was found to have a   pool of fresh blood in the stomach and large grade III esophageal varices,   but no active bleeding from the varices.  Dr. Martinez was concerned that   therapeutics might result in pharyngeal bleeding and he felt that it was best   to abort the procedure and sent him to the emergency room where I am seeing   him currently.  He denies any black stools or vomiting blood.  He denies any   history of alcohol intake.  He has previously tested negative for hepatitis B   and C and the source of his cirrhosis has been cryptogenic.  He had an ERCP   for choledocholithiasis in 2005 and at that time had normal bile ducts.  He   has osteoarthritis in his hands and he has been on meloxicam on a regular   basis.    PAST MEDICAL HISTORY:  He has cryptogenic cirrhosis, esophageal varices,   paraesophageal hiatal hernia repair by Dr. Ganser, diverticulosis,   osteoarthritis, thrombocytopenia, and hyperlipidemia.    PAST SURGICAL HISTORY:  He had a paraesophageal hiatal hernia repair   approximately 2013, cholecystectomy in 2005 and a colonoscopy in 2013.    ALLERGIES:  SULFA.    MEDICATIONS:  Lipitor and meloxicam.    SOCIAL HISTORY:  He is , lives [place] and is a retired pharmacist.    HABITS:  No tobacco or alcohol.    FAMILY HISTORY:  Noncontributory.    REVIEW OF SYSTEMS:  CONSTITUTIONAL:  No fevers or chills.  NEUROLOGIC:  No strokes or seizures.  PSYCHIATRIC:  No problems.  PULMONARY:  Denies asthma, wheezing or cough.  CARDIAC:  No chest pain.  He does have a heart  murmur.  GASTROINTESTINAL:  Occasional heartburn.  GENITOURINARY:  Has slow urine.  MUSCULOSKELETAL:  He has osteoarthritis primarily in his hands.  ENDOCRINE:  No history of diabetes or thyroid disease.    PHYSICAL EXAMINATION:  GENERAL:  Lean, alert, elderly male in no distress.  VITAL SIGNS:  Temperature is 37.1, pulse 61, respirations 18, blood pressure   174/92.  SKIN:  No stigmata of liver disease.  LYMPH NODES:  No nodes.  HEAD AND NECK:  Sclerae are anicteric.  Oropharynx clear.  NECK:  Supple.  LUNGS:  Few crackles in the left base.  HEART:  Regular rate and rhythm.  There is a II/VI systolic ejection murmur.  ABDOMEN:  Soft, nontender, no mass.  EXTREMITIES:  No edema.  He has some osteoarthritic changes in his hands.    LABORATORY DATA:  White count 2.7, hemoglobin 11.8, hematocrit 36.9, platelets   72,000.  Sodium is 138, potassium 3.8, BUN 22, creatinine 0.77, AST 28, ALT   22, alkaline phosphatase 157, total bilirubin 1.6.  INR is 1.13.    IMPRESSION:  1.  Fresh pool of blood seen in the stomach this afternoon during outpatient   endoscopy, along with large esophageal varices and the procedure was aborted   without any therapeutics.  2.  Cryptogenic cirrhosis.  3.  Esophageal varices.  4.  Thrombocytopenia.  5.  Mild coagulopathy.  6.  Chronic NSAID use for arthritis.  7.  Hyperlipidemia.  8.  Paraesophageal hiatal hernia repair.  9.  Diverticulosis.  10.  Osteoarthritis.    PLAN:  1.  He is being admitted by Dr. Serna.  2.  IV octreotide and q. 12 hour pantoprazole.  3.  Gastroscopy tomorrow morning with therapeutics by Dr. Colbert.  4.  Clear liquids overnight.  6.  N.p.o. at 6:00 a.m.  7.  He should ideally avoid NSAIDs in the future which are contraindicated   cirrhotics due to bleeding risk.  He can probably safely take up to 2000 mg a   day of Tylenol.  Medical decision making is complex.       ____________________________________     JAREN WHITE MD    [facility] / [facility]    DD:  03/14/2017  18:17:43  DT:  03/14/2017 21:00:19    D#:  [id number]  Job#:  [id number]    cc: JUAN DAVID WHITTEN MD

## 2017-03-15 NOTE — PROGRESS NOTES
Bedside report received. Patient A&O x 4. VS'S. Currently on 2 L via [place] Not on home O2. Complains of mild epigastric discomfort. NPO since midnight for scheduled EGD today. HGB stable at 10.7. Octreotide running at 10 ml/ hr. POC discussed with patient. Pt verbalizes understanding. Call light and belongings with in reach. Bed locked and in lowest position, alarm and fall precautions in place.

## 2017-03-15 NOTE — CARE PLAN
Problem: Communication  Goal: The ability to communicate needs accurately and effectively will improve  Outcome: PROGRESSING AS EXPECTED  Educated patient on plan of care. Updated white board. All questions answered.     Problem: Safety  Goal: Will remain free from falls  Outcome: PROGRESSING AS EXPECTED  Pt remained free from falls during shift. Pt oriented to call light, bed in low position and wheels locked. Pt encouraged to call for assistance. Bed alarm on and intact.

## 2017-03-15 NOTE — PROGRESS NOTES
Attempted to contact Pratibha (wife) but phone was busy several times. Family unaware at this time that the pt has moved to Tele room 817.

## 2017-03-16 ENCOUNTER — PATIENT OUTREACH (OUTPATIENT)
Dept: HEALTH INFORMATION MANAGEMENT | Facility: OTHER | Age: 82
End: 2017-03-16

## 2017-03-16 VITALS
HEART RATE: 81 BPM | BODY MASS INDEX: 23.88 KG/M2 | HEIGHT: 67 IN | OXYGEN SATURATION: 95 % | TEMPERATURE: 97 F | WEIGHT: 152.12 LBS | RESPIRATION RATE: 18 BRPM | DIASTOLIC BLOOD PRESSURE: 61 MMHG | SYSTOLIC BLOOD PRESSURE: 118 MMHG

## 2017-03-16 LAB
HGB BLD-MCNC: 11.3 G/DL (ref 14–18)
HGB BLD-MCNC: 11.5 G/DL (ref 14–18)

## 2017-03-16 PROCEDURE — 36415 COLL VENOUS BLD VENIPUNCTURE: CPT

## 2017-03-16 PROCEDURE — C9113 INJ PANTOPRAZOLE SODIUM, VIA: HCPCS | Performed by: HOSPITALIST

## 2017-03-16 PROCEDURE — 99239 HOSP IP/OBS DSCHRG MGMT >30: CPT | Performed by: INTERNAL MEDICINE

## 2017-03-16 PROCEDURE — 85018 HEMOGLOBIN: CPT

## 2017-03-16 PROCEDURE — 700111 HCHG RX REV CODE 636 W/ 250 OVERRIDE (IP): Performed by: HOSPITALIST

## 2017-03-16 PROCEDURE — 700102 HCHG RX REV CODE 250 W/ 637 OVERRIDE(OP): Performed by: INTERNAL MEDICINE

## 2017-03-16 PROCEDURE — A9270 NON-COVERED ITEM OR SERVICE: HCPCS | Performed by: INTERNAL MEDICINE

## 2017-03-16 RX ORDER — OMEPRAZOLE 20 MG/1
20 CAPSULE, DELAYED RELEASE ORAL DAILY
Qty: 30 CAP | Refills: 0 | Status: SHIPPED | OUTPATIENT
Start: 2017-03-16

## 2017-03-16 RX ADMIN — FLUTICASONE PROPIONATE 50 MCG: 50 SPRAY, METERED NASAL at 08:25

## 2017-03-16 RX ADMIN — PANTOPRAZOLE SODIUM 40 MG: 40 INJECTION, POWDER, FOR SOLUTION INTRAVENOUS at 08:25

## 2017-03-16 ASSESSMENT — PAIN SCALES - GENERAL
PAINLEVEL_OUTOF10: 0
PAINLEVEL_OUTOF10: 1
PAINLEVEL_OUTOF10: 0
PAINLEVEL_OUTOF10: 0

## 2017-03-16 NOTE — DISCHARGE INSTRUCTIONS
Discharge Instructions    Discharged to home by car with relative. Discharged via wheelchair, hospital escort: Yes.  Special equipment needed: Not Applicable    Be sure to schedule a follow-up appointment with your primary care doctor or any specialists as instructed.     Discharge Plan:   Diet Plan: Discussed  Activity Level: Discussed  Confirmed Follow up Appointment: Patient to Call and Schedule Appointment  Confirmed Symptoms Management: Discussed  Medication Reconciliation Updated: Yes  Influenza Vaccine Indication: Not indicated: Previously immunized this influenza season and > 8 years of age    I understand that a diet low in cholesterol, fat, and sodium is recommended for good health. Unless I have been given specific instructions below for another diet, I accept this instruction as my diet prescription.   Other diet: Low sodium, low fat, well balanced diet. Advance as tolerated.    Special Instructions: None    · Is patient discharged on Warfarin / Coumadin?   No     · Is patient Post Blood Transfusion?  No    Depression / Suicide Risk    As you are discharged from this [facility] facility, it is important to learn how to keep safe from harming yourself.    Recognize the warning signs:  · Abrupt changes in personality, positive or negative- including increase in energy   · Giving away possessions  · Change in eating patterns- significant weight changes-  positive or negative  · Change in sleeping patterns- unable to sleep or sleeping all the time   · Unwillingness or inability to communicate  · Depression  · Unusual sadness, discouragement and loneliness  · Talk of wanting to die  · Neglect of personal appearance   · Rebelliousness- reckless behavior  · Withdrawal from people/activities they love  · Confusion- inability to concentrate     If you or a loved one observes any of these behaviors or has concerns about self-harm, here's what you can do:  · Talk about it- your feelings and reasons for harming  yourself  · Remove any means that you might use to hurt yourself (examples: pills, rope, extension cords, firearm)  · Get professional help from the community (Mental Health, Substance Abuse, psychological counseling)  · Do not be alone:Call your Safe Contact- someone whom you trust who will be there for you.  · Call your local CRISIS HOTLINE 305-3772 or 789-203-5831  · Call your local Children's Mobile Crisis Response Team [place] (920) 471-1666 or wwwArchevos  · Call the toll free National Suicide Prevention Hotlines   · National Suicide Prevention Lifeline 253-293-QSRI (0377)  · National Hope Line Network 800-SUICIDE (566-5994)    Gastrointestinal Bleeding  Gastrointestinal bleeding is bleeding somewhere along the path that food travels through the body (digestive tract). This path is anywhere between the mouth and the opening of the butt (anus). You may have blood in your throw up (vomit) or in your poop (stools). If there is a lot of bleeding, you may need to stay in the hospital.  HOME CARE  · Only take medicine as told by your doctor.  · Eat foods with fiber such as whole grains, fruits, and vegetables. You can also try eating 1 to 3 prunes a day.  · Drink enough fluids to keep your pee (urine) clear or pale yellow.  GET HELP RIGHT AWAY IF:   · Your bleeding gets worse.  · You feel dizzy, weak, or you pass out (faint).  · You have bad cramps in your back or belly (abdomen).  · You have large blood clumps (clots) in your poop.  · Your problems are getting worse.  MAKE SURE YOU:   · Understand these instructions.  · Will watch your condition.  · Will get help right away if you are not doing well or get worse.     This information is not intended to replace advice given to you by your health care provider. Make sure you discuss any questions you have with your health care provider.     Document Released: 09/26/2009 Document Revised: 12/04/2013 Document Reviewed: 11/26/2012  [employer]  Patient Education ©2016 [employer]

## 2017-03-16 NOTE — PROCEDURES
DATE OF SERVICE:  03/15/2017    PROCEDURE:  This is an EGD with esophageal variceal banding report.    ENDOSCOPIST:  Carlos Colbert MD    INDICATION:  Esophageal varices and concern about GI bleeding.    DESCRIPTION OF PROCEDURE:  The patient was explained in details the   indications as well as the risks, the benefits, the alternatives of the   procedure, and he indicates understanding of all this and agrees to proceed   and consent is signed and placed in the chart.  After the patient received   medications, which were fentanyl 100 mcg IV and Versed 4 mg IV throughout the   procedure and was deemed adequately sedated, a standard Olympus gastroscope   was lubricated and gently placed through a bite block over the top of his   tongue down into his esophagus.  From here, it was noted even into the mid   esophagus that there was large esophageal varices noted.  No active bleeding   and no high risk stigmata on any of the varices were seen.  The scope was   traversed through the esophagus into the stomach and it was noted there   appears to be a very small amount of bezoar in the stomach with some pinkish   red contents, but no evidence of blood.  As much as this is possible we   suctioned out of the scope and at least two-thirds of it was easy to remove.    The scope was then maneuvered through the body of the stomach through the   pylorus into the duodenum and farthest reach of the endoscope was second   portion of the duodenum.  The scope was then slowly and carefully withdrawn   looking mucosa in a circumferential methodic manner.  No abnormalities were   noted and the scope was withdrawn back into the stomach whereby a careful   inspection including in retroflexion position revealed no other abnormalities   other than a small amount of bezoar remaining.  No gastric varices were noted.    Air was suctioned out of the stomach and the scope withdrawn back into the   esophagus, whereby we turned our attention to  these large esophageal varices.    It appears that he has at least 4-5 columns of esophageal varices, some of   which form a muscle plexus and traverse up into the mid esophagus.  At this   point, the scope was removed and a standard 6 shooter banding kit was attached   and then the scope reintroduced back down into the esophagus into the distal   esophagus and then a total of 6 bands were placed all successfully with   excellent results and no posttreatment bleeding.  The scope was then removed   and the procedure terminated.    FINDINGS:  1.  Large nonbleeding esophageal varices banded.  2.  Gastric bezoar.  3.  No evidence of recent or past bleeding.  The scope was then withdrawn.    COMPLICATIONS:  None.    TISSUE/BIOPSIES:  None.    THERAPY:  Esophageal variceal banding.    IMPRESSION/PLAN/MEDICAL DECISION MAKIN.  Esophageal varices with bleeding.  It is unclear if there really was any   bleeding.  In my opinion, I do not see any high risk stigmata on any other   varices.  There is no blood in there.  His BUN and creatinine ratio is normal   and his hemoglobin is stable.  At this point, I do not think he bled actually   and we should treat him as we would somebody else getting this banding done as   an outpatient.  Hence, I think he is okay for discharge.  I would not put him   on octreotide or anything like that can be stopped.  We can advance his diet   to full and I will follow up with him as an outpatient.  2.  Cirrhosis, nonalcoholic.  3.  Possible upper gastrointestinal bleed.  Again, at this point, I do not   think there was any bleeding.       ____________________________________     MD DORENE GODOY / KINSEY    DD:  03/15/2017 15:06:21  DT:  03/15/2017 17:35:31    D#:  [id number]  Job#:  [id number]

## 2017-03-16 NOTE — PROGRESS NOTES
HOSPITALIST PROGRESS NOTE (SOAP)   Date of Service  3/15  CHIEF COMPLAINT  83 y.o.yo male presented 3/14/2017 with Upper GI Bleed    SUBJECTIVE  Chart reviewed. Currently, he is not actively bleeding. Awaiting GI.  PHYSICAL EXAM  Filed Vitals:    03/15/17 1620 03/15/17 1650 03/15/17 1720 03/15/17 1952   BP: 151/80 149/70 156/86 147/82   Pulse: 52  56 76   Temp:    36.8 °C (98.2 °F)   Resp:    16   Height:       Weight:       SpO2:    97%       Intake/Output Summary (Last 24 hours) at 03/15/17 2012  Last data filed at 03/15/17 0000   Gross per 24 hour   Intake    350 ml   Output      0 ml   Net    350 ml   Vitals reviewed  General/Constitutional: No acute distress.   Head: Normocephalic, atraumatic  ENT: Oral mucosa is moist. No obvious pharyngeal exudates  Eyes: Pink conjunctiva, no scleral icterus  Neck: Supple, no lymphadenopathy  Cardiovascular: Normal rate and regular rhythm. S1,2 noted. No murmurs, gallops or rubs.  Pulmonary: Clear to auscultation bilaterally. No wheezes, rales or rhonchi  Abdominal: Soft, nontender, not distended, bowel sounds normoactive.  Musculoskeletal: No tenderness to palpation of chest wall.  Neurologic: Grossly nonfocal, moving all extremities.  Genitourinary: No gross hematuria  Skin: No obvious rash. Somewhat pale.  Psychiatric: Pleasant, cooperative.    LABS  Lab Results   Component Value Date/Time    WBC 2.7* 03/14/2017 04:30 PM    RBC 4.57* 03/14/2017 04:30 PM    HEMOGLOBIN 11.4* 03/15/2017 10:06 AM    HEMATOCRIT 36.9* 03/14/2017 04:30 PM    MCV 80.7* 03/14/2017 04:30 PM    MCH 25.8* 03/14/2017 04:30 PM    MCHC 32.0* 03/14/2017 04:30 PM    MPV 11.1 03/14/2017 04:30 PM    NEUTROPHILS-POLYS 69.50 03/14/2017 04:30 PM    LYMPHOCYTES 19.20* 03/14/2017 04:30 PM    MONOCYTES 7.10 03/14/2017 04:30 PM    EOSINOPHILS 3.80 03/14/2017 04:30 PM    BASOPHILS 0.40 03/14/2017 04:30 PM      Lab Results   Component Value Date/Time    SODIUM 138 03/15/2017 02:00 AM    POTASSIUM 3.8 03/15/2017 02:00  AM    CHLORIDE 107 03/15/2017 02:00 AM    CO2 27 03/15/2017 02:00 AM    GLUCOSE 127* 03/15/2017 02:00 AM    BUN 19 03/15/2017 02:00 AM    CREATININE 0.68 03/15/2017 02:00 AM      Lab Results   Component Value Date/Time    PT 14.9* 03/14/2017 04:30 PM    INR 1.13 03/14/2017 04:30 PM      No results found for: BLOODCULTU, BLDCULT, BCHOLD  Labs reviewed  Imaging reviewed  ASSESSMENT and PLAN    Principal Problem:    GIB (gastrointestinal bleeding)  Active Problems:    Acute blood loss anemia, mild at best    History of esophageal varices with bleeding    Cryptogenic cirrhosis (CMS-HCC)    Pancytopenia (CMS-HCC)  Plan for EGD with likely banding. Currently on PPI and octreotide infusion. Hold aspirin and meloxicam Await for Gastroenterology recommendations.    Dyslipidemia  Can now continue statin after EGD    SCDs, ambulate when possible.    I spent 38 minutes, reviewing the chart, notes, vitals, labs, imaging, ordering labs, evaluating Georges Calix for assessment, enacting the plan above and writing this note. 50% of the time was spent in counseling Georges Calix and answering questions. All the above were discussed. Medical decision making is therefore complex.

## 2017-03-16 NOTE — CARE PLAN
Problem: Communication  Goal: The ability to communicate needs accurately and effectively will improve  Intervention: Educate patient and significant other/support system about the plan of care, procedures, treatments, medications and allow for questions  Patient's white board is updated. Patient is updated on plan of care. All questions were answered.       Problem: Knowledge Deficit  Goal: Knowledge of disease process/condition, treatment plan, diagnostic tests, and medications will improve  Intervention: Explain information regarding disease process/condition, treatment plan, diagnostic tests, and medications and document in education  Pt educated about disease process and plan of care for the day.  Pt verbalized understanding.

## 2017-03-16 NOTE — DISCHARGE SUMMARY
HOSPITAL MEDICINE DISCHARGE SUMMARY    Name: Georges Calix  MRN: [id number]  : 1934  Admit Date: 3/14/2017  Discharge Date: 3/16/2017  Attending Provider: Tobin Gutiérrez M.D.  Primary Care Physician: Pcp Not In Computer    DISCHARGE DIAGNOSES, PLAN AND FOLLOW-UP:   Principal Problem:    GIB (gastrointestinal bleeding)    Esophageal varices  Active Problems:    Acute blood loss anemia, mild at best    History of esophageal varices with bleeding    Cryptogenic cirrhosis (CMS-HCC)    Pancytopenia (CMS-HCC)  EGD doine by Dr. Colbert showed esophageal varices but it was unclear if they were bleeding and he do not see any high risk stigmata or any other varices. There is no blood in there.  His BUN and creatinine ratio is normal    and his hemoglobin is stable. He therefore cleared him for discharge and he can be further evaluated in the outpatient if banding is needed. I will give him omeprazole for PPI. His Hb remained stable at 11. I discontinued his meloxicam.  Follow up with Dr. Colbert, Gastroenterology in 1 week, postEGD followup. H/H recommended on that visit.  Assign and follow up with [facility] primary care provider in 1-2 week for general check up.     Dyslipidemia  Can now continue statin after EGD  HOSPITAL COURSE  Please review Dr. Michael Serna M.D. notes for further details of history of present illness, past medical/social/family histories, allergies and medications. Please review Dr. Colbert consultation notes.    See above. He underwent EGD which revealed varices, but per GI, no evoidence of bleeding or high risk stigmata. GI cleared him for discharge with follow up. He was evaluated by nursing, ROBERT and was deemed stable to go home. He underwent walking oximetry twice and per results, he did not require oxygen.    Please see discharge diagnoses, plan and follow up above for details of presenting problem and other medical issues    Georges Calix improved and was deemed ready to be discharged from  the hospital as there were no further inpatient needs. Georges Calix felt comfortable going home. The discharge plan was discussed with Georges Calix, and agreed to it. Georges Calix was subsequently discharged in improved and stable condition.    DISCHARGE MEDICATIONS:    Georges Calix   Home Medication Instructions VALORIE:[id number]    Printed on:03/16/17 9740   Medication Information                      aspirin EC (ECOTRIN) 81 MG Tablet Delayed Response  Take 81 mg by mouth every day.             atorvastatin (LIPITOR) 10 MG Tab  Take 10 mg by mouth every day.             fexofenadine (ALLEGRA) 60 MG Tab  Take 60 mg by mouth every day.             fluticasone (FLONASE) 50 MCG/ACT nasal spray  Spray 1 Spray in nose every day.             omeprazole (PRILOSEC) 20 MG delayed-release capsule  Take 1 Cap by mouth every day.                 DISCHARGE VITALS, LABS and IMAGING  Filed Vitals:    03/15/17 1952 03/15/17 2350 03/16/17 0355 03/16/17 0845   BP: 147/82 176/81 145/62 118/61   Pulse: 76 64 75 81   Temp: 36.8 °C (98.2 °F) 35.8 °C (96.5 °F) 36.2 °C (97.1 °F) 36.1 °C (97 °F)   Resp: 16 16 16 18   Height:       Weight: 69 kg (152 lb 1.9 oz)      SpO2: 97% 96% 93% 95%     Lab Results   Component Value Date/Time    WBC 2.7* 03/14/2017 04:30 PM    RBC 4.57* 03/14/2017 04:30 PM    HEMOGLOBIN 11.3* 03/16/2017 09:45 AM    HEMATOCRIT 36.9* 03/14/2017 04:30 PM    MCV 80.7* 03/14/2017 04:30 PM    MCH 25.8* 03/14/2017 04:30 PM    MCHC 32.0* 03/14/2017 04:30 PM    MPV 11.1 03/14/2017 04:30 PM    NEUTROPHILS-POLYS 69.50 03/14/2017 04:30 PM    LYMPHOCYTES 19.20* 03/14/2017 04:30 PM    MONOCYTES 7.10 03/14/2017 04:30 PM    EOSINOPHILS 3.80 03/14/2017 04:30 PM    BASOPHILS 0.40 03/14/2017 04:30 PM      Lab Results   Component Value Date/Time    SODIUM 138 03/15/2017 02:00 AM    POTASSIUM 3.8 03/15/2017 02:00 AM    CHLORIDE 107 03/15/2017 02:00 AM    CO2 27 03/15/2017 02:00 AM    GLUCOSE 127* 03/15/2017 02:00 AM     BUN 19 03/15/2017 02:00 AM    CREATININE 0.68 03/15/2017 02:00 AM      Lab Results   Component Value Date/Time    PT 14.9* 03/14/2017 04:30 PM    INR 1.13 03/14/2017 04:30 PM        No results found.    Please see discharge diagnoses, plan and follow up above for details of pending tests and postdischarge instructions for the clinic providers and specialists.    Please CC [facility] primary care provider, Dr. Colbert    For further details on discharge medications, patient education, diet, and activity, please refer to electronic copy of discharge instructions.       TIME SPENT: 38 minutes, with greater than 50% of the time spent on face-to-face encounter, addressing medical issues, coordination of care, counseling, discharge planning, medication reconciliation, and documentation.

## 2017-03-16 NOTE — PROGRESS NOTES
Received report from previous nurse regarding prior 12 hours.  POC reviewed with pt, white board updated, pt verbalized understanding, call light within reach.  Pt tolerated evening meds.

## 2017-03-16 NOTE — PROGRESS NOTES
Received report from nightshift RN, assumed care of patient with student RN. Patient is A&O x 4, bed alarm on. Patient educated on importance of calling if in need of assistance. Verbalizes understanding. Patient declines pain at this time. Patient on O2 in hospital, no home O2. Stats taken while at rest, patient drops to 88, will do walking eval. Patient assisted to restroom, steady with assist of IV pole. Patient updated on plan of care, voices no concerns at this time. Will continue to monitor for safety and comfort.

## 2017-03-30 ENCOUNTER — HOSPITAL ENCOUNTER (OUTPATIENT)
Dept: LAB | Facility: MEDICAL CENTER | Age: 82
End: 2017-03-30
Attending: GENERAL PRACTICE
Payer: MEDICARE

## 2017-03-30 LAB
BASOPHILS # BLD AUTO: 0 % (ref 0–1.8)
BASOPHILS # BLD: 0 K/UL (ref 0–0.12)
EOSINOPHIL # BLD AUTO: 0.04 K/UL (ref 0–0.51)
EOSINOPHIL NFR BLD: 1 % (ref 0–6.9)
ERYTHROCYTE [DISTWIDTH] IN BLOOD BY AUTOMATED COUNT: 40.7 FL (ref 35.9–50)
HCT VFR BLD AUTO: 35.5 % (ref 42–52)
HGB BLD-MCNC: 11.9 G/DL (ref 14–18)
LYMPHOCYTES # BLD AUTO: 0.6 K/UL (ref 1–4.8)
LYMPHOCYTES NFR BLD: 14 % (ref 22–41)
MANUAL DIFF BLD: ABNORMAL
MCH RBC QN AUTO: 26 PG (ref 27–33)
MCHC RBC AUTO-ENTMCNC: 33.5 G/DL (ref 33.7–35.3)
MCV RBC AUTO: 77.7 FL (ref 81.4–97.8)
MONOCYTES # BLD AUTO: 0.26 K/UL (ref 0–0.85)
MONOCYTES NFR BLD AUTO: 6 % (ref 0–13.4)
NEUTROPHILS # BLD AUTO: 3.35 K/UL (ref 1.82–7.42)
NEUTROPHILS NFR BLD: 78 % (ref 44–72)
NEUTS BAND NFR BLD MANUAL: 1 % (ref 0–10)
PLATELET # BLD AUTO: 100 K/UL (ref 164–446)
PMV BLD AUTO: 10.7 FL (ref 9–12.9)
RBC # BLD AUTO: 4.57 M/UL (ref 4.7–6.1)
WBC # BLD AUTO: 4.3 K/UL (ref 4.8–10.8)

## 2017-03-30 PROCEDURE — 36415 COLL VENOUS BLD VENIPUNCTURE: CPT

## 2017-03-30 PROCEDURE — 85007 BL SMEAR W/DIFF WBC COUNT: CPT

## 2017-03-30 PROCEDURE — 85027 COMPLETE CBC AUTOMATED: CPT

## 2019-05-07 NOTE — PROGRESS NOTES
Patient back in room from procedure, drowsy. VS'S, currently on 3 L via [place] Family at bedside.   TC with RN@ [facility]. RN states that member is still having issues with both receptive and expresive aphasia. This will be a long term healing process for member and progress will be slow. Member has hx of stroke and some marching style gait but is moving around safely. Member is able to feed self and no issues with swallowing have been observed. Member will be discharged home with outpatient speech, OT and PT therapies.  Plan: Will follow up with RN/CM on 5/9/19.  Kylie Gan RN [facility] Complex Case Manager

## 2023-09-19 NOTE — ED NOTES
According to last ECHO at [facility], EF 25-30%.     - Repeat ECHO pending   - Cardiology consulted for pre-op clearance   - Carvedilol 6.25mg BID  - No ACE/ARB/ARNI secondary to hypotension requiring Milrinone     ECHO from [facility]  The left ventricular ejection fraction is severely decreased (25 - 30%).  The left ventricle is moderately dilated.  The left atrium is severely dilated.  The right atrium is moderately dilated.  There is mild aortic valve regurgitation.  There is trace mitral regurgitation present.  There is mild tricuspid regurgitation present.  The estimated central venous pressure is markedly elevated (15 mm Hg).     Med rec updated and complete  Allergies reviewed.  Pt denies antibiotics in last 30 days.

## (undated) DEVICE — CON SEDATION EA ADDL 15 MIN

## (undated) DEVICE — SYRINGE 6 CC 20 GA X 1 1/2 - NDL SAFETY  (50/BX)

## (undated) DEVICE — GOWN SURGEONS X-LARGE - DISP. (30/CA)

## (undated) DEVICE — MASK WITH FACE SHIELD (25/BX 4BX/CA)

## (undated) DEVICE — SOD. CHL 10CC SYRINGE PREFILL - W/10 CC (30/BX)

## (undated) DEVICE — BITE BLOCK ADULT 60FR (100EA/CA)

## (undated) DEVICE — FILM CASSETTE ENDO

## (undated) DEVICE — KIT CUSTOM PROCEDURE SINGLE FOR ENDO  (15/CA)

## (undated) DEVICE — CON SEDATION/>5 YR 1ST 15 MIN

## (undated) DEVICE — BASIN EMESIS DISP. - (250/CA)

## (undated) DEVICE — SYRINGE 3 CC 22 GA X 1-1/2 - NDL SAFETY (50/BX 8BX/CA)

## (undated) DEVICE — ELECTRODE 850 FOAM ADHESIVE - HYDROGEL RADIOTRNSPRNT (50/PK)

## (undated) DEVICE — SPEEDBAND SUPERVIEW SUPER 7 (4/BX)

## (undated) DEVICE — CANISTER SUCTION RIGID RED 1500CC (40EA/CA)